# Patient Record
Sex: MALE | Race: WHITE | NOT HISPANIC OR LATINO | Employment: FULL TIME | URBAN - METROPOLITAN AREA
[De-identification: names, ages, dates, MRNs, and addresses within clinical notes are randomized per-mention and may not be internally consistent; named-entity substitution may affect disease eponyms.]

---

## 2019-06-28 ENCOUNTER — OFFICE VISIT (OUTPATIENT)
Dept: FAMILY MEDICINE | Facility: CLINIC | Age: 34
End: 2019-06-28
Payer: COMMERCIAL

## 2019-06-28 VITALS
BODY MASS INDEX: 27.38 KG/M2 | HEIGHT: 71 IN | WEIGHT: 195.56 LBS | DIASTOLIC BLOOD PRESSURE: 86 MMHG | OXYGEN SATURATION: 97 % | RESPIRATION RATE: 20 BRPM | HEART RATE: 78 BPM | SYSTOLIC BLOOD PRESSURE: 110 MMHG | TEMPERATURE: 98 F

## 2019-06-28 DIAGNOSIS — R10.32 LEFT INGUINAL PAIN: Primary | ICD-10-CM

## 2019-06-28 PROCEDURE — 99999 PR PBB SHADOW E&M-NEW PATIENT-LVL V: CPT | Mod: PBBFAC,,, | Performed by: NURSE PRACTITIONER

## 2019-06-28 PROCEDURE — 3008F BODY MASS INDEX DOCD: CPT | Mod: CPTII,S$GLB,, | Performed by: NURSE PRACTITIONER

## 2019-06-28 PROCEDURE — 3008F PR BODY MASS INDEX (BMI) DOCUMENTED: ICD-10-PCS | Mod: CPTII,S$GLB,, | Performed by: NURSE PRACTITIONER

## 2019-06-28 PROCEDURE — 99999 PR PBB SHADOW E&M-NEW PATIENT-LVL V: ICD-10-PCS | Mod: PBBFAC,,, | Performed by: NURSE PRACTITIONER

## 2019-06-28 PROCEDURE — 99214 PR OFFICE/OUTPT VISIT, EST, LEVL IV, 30-39 MIN: ICD-10-PCS | Mod: S$GLB,,, | Performed by: NURSE PRACTITIONER

## 2019-06-28 PROCEDURE — 99214 OFFICE O/P EST MOD 30 MIN: CPT | Mod: S$GLB,,, | Performed by: NURSE PRACTITIONER

## 2019-06-28 NOTE — PATIENT INSTRUCTIONS
Varicocele  A varicocele ([id number]-wq-fvo-seel) is a swelling in the veins above the testicles. It is similar to a varicose vein in the legs. The swelling happens when too much blood collects in the veins. It most often occurs around the left testicle. A varicocele may cause the sac of skin covering the testicles (the scrotum) to have a bluish color. It may also cause an achy or heavy feeling in the scrotum. The pain may be worse later in the day or after you have been standing for a long time. Some varicoceles can be seen all the time. Others can be seen only when you are standing. Or they may only be seen when a Valsalva maneuver is done. This means bearing down in your belly (abdomen) to increase pressure.  In most cases, a varicocele is not serious and doesn't need to be treated. But it is linked to being unable to have a child (infertility). If you and your partner are trying to have a baby, talk with your healthcare provider about your options.    No medicines are available to fix this condition. Surgery can be done to close off the enlarged veins. A varicocele is not serious. And all surgery has risks. So you and your healthcare provider may consider surgery only if:  · The pain becomes worse or you have new symptoms  · The testicle shrinks (atrophy)  · You want to try to improve your fertility  Home care  To help lessen discomfort, aching, or pain:  · Wear a jockstrap or snug underwear  · Take an over-the-counter pain reliever, such as ibuprofen  Follow-up care  Follow up with your healthcare provider, or as advised. Schedule regular exams with your provider so the varicocele can be watched. Be sure to keep all your appointments. Tell your provider if you notice any changes in your testicles or scrotum.   When to seek medical advice  Call your healthcare provider right away if any of these occur:  · Increasing pain in your scrotum  · Trouble urinating  · A lump in the testicle  · A bulge in the groin area  appears or gets bigger  Date Last Reviewed: 6/1/2016  © 8174-0950 [employer]. [address] [place] [address], [place] [address]. All rights reserved. This information is not intended as a substitute for professional medical care. Always follow your healthcare professional's instructions.    Activity as tolerated.  Ibuprofen for pain,    If you have any questions, please call.  You can reach us at [phone] Monday through Thursday (except holidays) 8:30 a.m. to 5 p.m.     Note:  I do not work on Fridays.  So if you have concerns or questions, please contact your primary care provider team or [facility] or go to the Urgent Care on Friday for concerns.     Thank you for using our Primary Care Service!    SALOMÓN Martinez, CNP, FNP-BC  [facility]    To rate your experience with GENTRY Martinez, click on the link below:      [email or web address]

## 2019-06-28 NOTE — PROGRESS NOTES
Matt Rocha is a 34 y.o. male patient of Daryl Garrett MD who presents to the clinic today for   Chief Complaint   Patient presents with    Groin Pain     left   .    HPI    Pt, who is known to me, reports a new problem to me:  Left groin pain that increases with activity like yard work.  During the week he sits at a desk and notes no pain.  This morning he noted the pain on awakening.  No point of injury noted.  Works out 2x per week running and row machine.    About 3 months ago he did have a sensation of irritation/fatigue in the left groin.  He stopped.  No sxs after that for several weeks.  Hasn't noted any change in appearance.  Pain is not radiating.  No specific activity bothers the area.    He has not been working out since this pain started.    These symptoms began 6 weeks ago and status is unchanged.     Pt denies the following symptoms:  Injury just prior to onset, aggravating activity    Aggravating factors include nothing .    Relieving factors include nothing.  Has not exercised but continues yard work .    OTC Medications tried are none tried.    Prescription medications taken for symptoms are none.    Pertinent history:  H/o varicocele.    ROS    Constitutional: No fever, no change in appetite.    GI:  No stomach ache, no nausea, no vomiting.    Urinary:  No dysuria, no frequency, no urgency, no flank pain.     HEENT/Heart/Lung/MS/Skin:  No symptoms or no changes except improvement in his sinuses-has limited his CHO intake in the same time period (12 months).      PAST MEDICAL HISTORY:  Past Medical History:   Diagnosis Date    Allergy        PAST SURGICAL HISTORY:  Past Surgical History:   Procedure Laterality Date    ESOPHAGOGASTRODUODENOSCOPY (EGD) N/A 11/7/2014    [facility]    HERNIA REPAIR      WISDOM TOOTH EXTRACTION         SOCIAL HISTORY:  Social History     Socioeconomic History    Marital status:      Spouse name: Not on file     Number of children: Not on file    Years of education: Not on file    Highest education level: Not on file   Occupational History    Not on file   Social Needs    Financial resource strain: Not on file    Food insecurity:     Worry: Not on file     Inability: Not on file    Transportation needs:     Medical: Not on file     Non-medical: Not on file   Tobacco Use    Smoking status: Never Smoker    Smokeless tobacco: Never Used   Substance and Sexual Activity    Alcohol use: Yes     Comment: socially    Drug use: No    Sexual activity: Not on file   Lifestyle    Physical activity:     Days per week: Not on file     Minutes per session: Not on file    Stress: Not on file   Relationships    Social connections:     Talks on phone: Not on file     Gets together: Not on file     Attends [employer] service: Not on file     Active member of club or organization: Not on file     Attends meetings of clubs or organizations: Not on file     Relationship status: Not on file   Other Topics Concern    Not on file   Social History Narrative    Not on file       FAMILY HISTORY:  Family History   Problem Relation Age of Onset    Hyperlipidemia Mother     Hypertension Mother     Hyperlipidemia Father     Hypertension Father        ALLERGIES AND MEDICATIONS: updated and reviewed.  Review of patient's allergies indicates:   Allergen Reactions    Ceclor [cefaclor] Hives    Shellfish containing products      Crab only per patient     Current Outpatient Medications   Medication Sig Dispense Refill    fexofenadine (ALLEGRA) 180 MG tablet Take 180 mg by mouth once daily.      fluticasone (FLONASE) 50 mcg/actuation nasal spray 1 spray by Each Nare route once daily.      loratadine (CLARITIN) 10 mg tablet Take 10 mg by mouth once daily.       No current facility-administered medications for this visit.          PHYSICAL EXAM    Alert, coop 34 y.o. male patient in no acute distress, is not ill-appearing.    Vitals:     06/28/19 0758   BP: 110/86   Pulse: 78   Resp: 20   Temp: 97.7 °F (36.5 °C)     VS reviewed.  VS stable.  CC, nursing note, medications & PMH all reviewed today.    Head:  Normocephalic, atraumatic.    EENT:  Ext nose/ears normal.               Eye lids normal, no discharge present.                       Resp:  Respirations even, unlabored             Lungs CTA bilat.    Heart:  Heart rate normal.  RRR, no MRG on ausculation.    ABD:  Soft, round, NT to pal.  Normal BS in all 4 quadrants.  No rebound or organomegaly.              No peritoneal signs.  No CVAT    :  No swelling noted.  No hernia palp.  Varicocele palp in the left scrotal sac.          Testes smooth, descended bilat.    MS:  Ambulates independently.          Pain not reproducible with full flexion of the left leg or with flexion or abduction of the left hip vs resistance.              NEURO:  Alert and oriented x 4.  Responds appropriately during interaction.    Skin:  Warm, dry, color good..    Psych:  Responds appropriately throughout the visit.               Relaxed.  Well-groomed.    Left inguinal pain  -     US Abdomen Limited; Future; Expected date: 06/28/2019  -     US Scrotum And Testicles; Future; Expected date: 06/28/2019  -     Ambulatory referral to Urology    Pt today presents with report of left groin pain x 6 weeks with h/o varicocele since youth.  Does do yard work and exercise but no injury recalled.  No visible sign of hernia reported.  On exam no hernia palp.  Pain not reproducible with full flexion of hip/knee or with abduction/flexion of hip vs resistance.  Pain likely r/t varicocele.    This is a new problem to me and the following work up is planned.    Lab & Radiological Tests Ordered: US hernia and testicles/scrotum.  The results are pending.  Urology consult.    Pt advised to perform comfort measures recommended on patient instruction sheet .    Explained exam findings, diagnosis and treatment plan to patient.  Questions  answered and patient states understanding.

## 2019-07-03 ENCOUNTER — HOSPITAL ENCOUNTER (OUTPATIENT)
Dept: RADIOLOGY | Facility: HOSPITAL | Age: 34
Discharge: HOME | End: 2019-07-03
Attending: NURSE PRACTITIONER
Payer: COMMERCIAL

## 2019-07-03 DIAGNOSIS — R10.32 LEFT INGUINAL PAIN: ICD-10-CM

## 2019-07-03 PROCEDURE — 76870 US EXAM SCROTUM: CPT | Mod: 26,,, | Performed by: RADIOLOGY

## 2019-07-03 PROCEDURE — 76870 US SCROTUM AND TESTICLES: ICD-10-PCS | Mod: 26,,, | Performed by: RADIOLOGY

## 2019-07-03 PROCEDURE — 76705 US ABDOMEN LIMITED: ICD-10-PCS | Mod: 26,,, | Performed by: RADIOLOGY

## 2019-07-03 PROCEDURE — 76705 ECHO EXAM OF ABDOMEN: CPT | Mod: TC,PO

## 2019-07-03 PROCEDURE — 76705 ECHO EXAM OF ABDOMEN: CPT | Mod: 26,,, | Performed by: RADIOLOGY

## 2019-07-03 PROCEDURE — 76870 US EXAM SCROTUM: CPT | Mod: TC,PO

## 2019-07-10 ENCOUNTER — OFFICE VISIT (OUTPATIENT)
Dept: UROLOGY | Facility: CLINIC | Age: 34
End: 2019-07-10
Payer: COMMERCIAL

## 2019-07-10 VITALS — BODY MASS INDEX: 27.32 KG/M2 | WEIGHT: 195.13 LBS | HEIGHT: 71 IN

## 2019-07-10 DIAGNOSIS — I86.1 LEFT VARICOCELE: Primary | ICD-10-CM

## 2019-07-10 PROCEDURE — 99999 PR PBB SHADOW E&M-EST. PATIENT-LVL II: ICD-10-PCS | Mod: PBBFAC,,,

## 2019-07-10 PROCEDURE — 99203 OFFICE O/P NEW LOW 30 MIN: CPT | Mod: S$GLB,,, | Performed by: UROLOGY

## 2019-07-10 PROCEDURE — 3008F BODY MASS INDEX DOCD: CPT | Mod: CPTII,S$GLB,, | Performed by: UROLOGY

## 2019-07-10 PROCEDURE — 99203 PR OFFICE/OUTPT VISIT, NEW, LEVL III, 30-44 MIN: ICD-10-PCS | Mod: S$GLB,,, | Performed by: UROLOGY

## 2019-07-10 PROCEDURE — 99999 PR PBB SHADOW E&M-EST. PATIENT-LVL II: CPT | Mod: PBBFAC,,,

## 2019-07-10 PROCEDURE — 3008F PR BODY MASS INDEX (BMI) DOCUMENTED: ICD-10-PCS | Mod: CPTII,S$GLB,, | Performed by: UROLOGY

## 2019-07-10 NOTE — PROGRESS NOTES
Subjective:       Patient ID: Matt Rocha is a 34 y.o. male.    Chief Complaint: Testicle Pain    Patient complains of some occasional discomfort in left testicle    Past Medical History:   Diagnosis Date    Allergy       Past Surgical History:   Procedure Laterality Date    ESOPHAGOGASTRODUODENOSCOPY (EGD) N/A 11/7/2014    [facility]    HERNIA REPAIR      WISDOM TOOTH EXTRACTION       Social History     Socioeconomic History    Marital status:      Spouse name: Not on file    Number of children: Not on file    Years of education: Not on file    Highest education level: Not on file   Occupational History    Not on file   Social Needs    Financial resource strain: Not on file    Food insecurity:     Worry: Not on file     Inability: Not on file    Transportation needs:     Medical: Not on file     Non-medical: Not on file   Tobacco Use    Smoking status: Never Smoker    Smokeless tobacco: Never Used   Substance and Sexual Activity    Alcohol use: Yes     Comment: socially    Drug use: No    Sexual activity: Not on file   Lifestyle    Physical activity:     Days per week: Not on file     Minutes per session: Not on file    Stress: Not on file   Relationships    Social connections:     Talks on phone: Not on file     Gets together: Not on file     Attends [employer] service: Not on file     Active member of club or organization: Not on file     Attends meetings of clubs or organizations: Not on file     Relationship status: Not on file   Other Topics Concern    Not on file   Social History Narrative    Not on file       Family History   Problem Relation Age of Onset    Hyperlipidemia Mother     Hypertension Mother     Hyperlipidemia Father     Hypertension Father       Review of patient's allergies indicates:   Allergen Reactions    Ceclor [cefaclor] Hives    Shellfish containing products      Crab only per patient     Medication List with  Changes/Refills   Discontinued Medications    FEXOFENADINE (ALLEGRA) 180 MG TABLET    Take 180 mg by mouth once daily.    FLUTICASONE (FLONASE) 50 MCG/ACTUATION NASAL SPRAY    1 spray by Each Nare route once daily.    LORATADINE (CLARITIN) 10 MG TABLET    Take 10 mg by mouth once daily.      Review of Systems   Constitutional: Negative for fatigue, fever and unexpected weight change.   HENT: Negative for congestion.    Eyes: Negative for visual disturbance.   Respiratory: Negative for cough, choking and shortness of breath.    Cardiovascular: Negative for chest pain and leg swelling.   Gastrointestinal: Negative for abdominal distention, constipation, diarrhea, nausea and vomiting.   Endocrine: Negative for polyuria.   Genitourinary: Positive for testicular pain. Negative for difficulty urinating, flank pain and hematuria.   Musculoskeletal: Negative for back pain.   Skin: Negative for color change and wound.   Allergic/Immunologic: Negative for immunocompromised state.   Neurological: Negative for weakness.   Hematological: Negative for adenopathy. Does not bruise/bleed easily.   Psychiatric/Behavioral: Negative for confusion.       Objective:      Physical Exam   Nursing note and vitals reviewed.  Constitutional: He is oriented to person, place, and time. He appears well-developed and well-nourished. No distress.   HENT:   Head: Normocephalic and atraumatic.   Eyes: Pupils are equal, round, and reactive to light.   Neck: Neck supple.   Cardiovascular: Normal rate.    Pulmonary/Chest: Effort normal. No respiratory distress. He has no wheezes.   Abdominal: Soft. He exhibits no distension and no mass. There is no tenderness. There is no rebound and no guarding. Hernia confirmed negative in the right inguinal area and confirmed negative in the left inguinal area.   Genitourinary: Prostate normal, testes normal and penis normal. Rectal exam shows no external hemorrhoid, no internal hemorrhoid, no mass and no  tenderness. Prostate is not tender. Right testis shows no swelling and no tenderness. Left testis shows no swelling and no tenderness. Circumcised. No penile tenderness.   Genitourinary Comments: Grade Ii left varicocele   Musculoskeletal: He exhibits no edema.   Neurological: He is alert and oriented to person, place, and time.   Skin: Skin is warm. He is not diaphoretic.           No results found for: NA, K, CL, CO2, CREATININE, GLU, MG, AST, ALT, WBC, HGB, HCT, PLT, INR, PSA       Assessment/Plan:     Left varicocele - grade II                            -options discussed                            -antiinflammatory prn     Problem List Items Addressed This Visit     None

## 2019-07-10 NOTE — LETTER
July 10, 2019      Delia White, AMANDO  [address] [facility] [address] [place] [address]           [facility]  [address] [facility] [address] [place] [address]  Phone: [phone]  Fax: [phone]          Patient: Matt Rocha   MR Number: [id number]   YOB: 1985   Date of Visit: 7/10/2019       Dear Delia White:    Thank you for referring Matt Rocha to me for evaluation. Attached you will find relevant portions of my assessment and plan of care.    If you have questions, please do not hesitate to call me. I look forward to following Matt Rocha along with you.    Sincerely,    Jossue Randall MD    Enclosure  CC:  No Recipients    If you would like to receive this communication electronically, please contact [email or web address] or [phone] to request more information on [employer] access.    For providers and/or their staff who would like to refer a patient to [facility], please contact us through our one-stop-shop provider referral line, [facility], at [phone].    If you feel you have received this communication in error or would no longer like to receive these types of communications, please e-mail [email or web address]

## 2021-05-06 ENCOUNTER — PATIENT MESSAGE (OUTPATIENT)
Dept: RESEARCH | Facility: HOSPITAL | Age: 36
End: 2021-05-06